# Patient Record
Sex: FEMALE | Race: AMERICAN INDIAN OR ALASKA NATIVE | Employment: FULL TIME | URBAN - METROPOLITAN AREA
[De-identification: names, ages, dates, MRNs, and addresses within clinical notes are randomized per-mention and may not be internally consistent; named-entity substitution may affect disease eponyms.]

---

## 2017-05-03 DIAGNOSIS — I10 ESSENTIAL HYPERTENSION: ICD-10-CM

## 2017-05-04 RX ORDER — CHLORTHALIDONE 50 MG/1
TABLET ORAL
Qty: 30 TAB | Refills: 0 | Status: SHIPPED | OUTPATIENT
Start: 2017-05-04 | End: 2017-05-23

## 2017-05-23 ENCOUNTER — OFFICE VISIT (OUTPATIENT)
Dept: INTERNAL MEDICINE CLINIC | Age: 47
End: 2017-05-23

## 2017-05-23 VITALS
DIASTOLIC BLOOD PRESSURE: 92 MMHG | BODY MASS INDEX: 31.05 KG/M2 | HEIGHT: 66 IN | OXYGEN SATURATION: 98 % | SYSTOLIC BLOOD PRESSURE: 160 MMHG | WEIGHT: 193.2 LBS | TEMPERATURE: 99.4 F | RESPIRATION RATE: 16 BRPM | HEART RATE: 99 BPM

## 2017-05-23 DIAGNOSIS — Z72.0 TOBACCO ABUSE: ICD-10-CM

## 2017-05-23 DIAGNOSIS — E78.5 HYPERLIPIDEMIA, UNSPECIFIED HYPERLIPIDEMIA TYPE: ICD-10-CM

## 2017-05-23 DIAGNOSIS — I10 ESSENTIAL HYPERTENSION: Primary | ICD-10-CM

## 2017-05-23 RX ORDER — LISINOPRIL AND HYDROCHLOROTHIAZIDE 12.5; 2 MG/1; MG/1
1 TABLET ORAL DAILY
Qty: 30 TAB | Refills: 3 | Status: SHIPPED | OUTPATIENT
Start: 2017-05-23 | End: 2017-06-19

## 2017-05-23 NOTE — PROGRESS NOTES
Subjective:     Chief Complaint   Patient presents with    Hypertension     She  is a 42y.o. year old female who presents for evaluation. Has had a lot of stress due to father being in ICU in [place]. Didn't have blood work done of colonoscopy performed. Has pressured speech and somewhat random. Historical Data    Past Medical History:   Diagnosis Date    Hypertension         Past Surgical History:   Procedure Laterality Date    HX GYN  1991, 1992    2 children        Outpatient Encounter Prescriptions as of 5/23/2017   Medication Sig Dispense Refill    chlorthalidone (HYGROTEN) 50 mg tablet TAKE 1 TABLET BY MOUTH DAILY 30 Tab 0    potassium chloride (K-DUR, KLOR-CON) 20 mEq tablet 1 tablet twice a day 60 Tab 3    ALPRAZolam (XANAX) 0.25 mg tablet   2    AMPHETAMINE SALT COMBO 10 mg tablet   0    LORazepam (ATIVAN) 2 mg tablet   0    pravastatin (PRAVACHOL) 20 mg tablet Take 1 Tab by mouth nightly. 30 Tab 11     No facility-administered encounter medications on file as of 5/23/2017. No Known Allergies     Social History     Social History    Marital status:      Spouse name: N/A    Number of children: N/A    Years of education: N/A     Occupational History    Not on file. Social History Main Topics    Smoking status: Light Tobacco Smoker     Packs/day: 1.00     Years: 27.00    Smokeless tobacco: Never Used    Alcohol use No    Drug use: No    Sexual activity: Yes     Partners: Male     Other Topics Concern    Not on file     Social History Narrative    No narrative on file        Review of Systems  A comprehensive review of systems was negative except for that written in the HPI. Objective:     Vitals:    05/23/17 1032   BP: (!) 160/92   Pulse: 99   Resp: 16   Temp: 99.4 °F (37.4 °C)   TempSrc: Oral   SpO2: 98%   Weight: 193 lb 3.2 oz (87.6 kg)   Height: 5' 6\" (1.676 m)     \"Amped up\" appearing female in no acute distress. Patient not re-examined.     ASSESSMENT / PLAN:   1. Essential hypertension  · Stop Hygroton and potassium. · Stop smoking. · Start lisinopril-HCTZ  - lisinopril-hydroCHLOROthiazide (PRINZIDE, ZESTORETIC) 20-12.5 mg per tablet; Take 1 Tab by mouth daily. Indications: hypertension  Dispense: 30 Tab; Refill: 3    2. Tobacco abuse  · Stop smoking. 3. Hyperlipidemia, unspecified hyperlipidemia type  · Check labs to see if needs treatment. Follow-up Disposition: Not on File   Advised her to call back or return to office if symptoms worsen/change/persist.  Discussed expected course/resolution/complications of diagnosis in detail with patient. Medication risks/benefits/costs/interactions/alternatives discussed with patient. She was given an after visit summary which includes diagnoses, current medications, & vitals. She expressed understanding with the diagnosis and plan.

## 2017-05-23 NOTE — PROGRESS NOTES
Chief Complaint   Patient presents with    Hypertension     Reviewed record in preparation for visit and have obtained necessary documentation. Identified pt with two pt identifiers(name and ). Health Maintenance Due   Topic    Pneumococcal 19-64 Medium Risk (1 of 1 - PPSV23)    DTaP/Tdap/Td series (1 - Tdap)    PAP AKA CERVICAL CYTOLOGY          Chief Complaint   Patient presents with    Hypertension        Wt Readings from Last 3 Encounters:   17 193 lb 3.2 oz (87.6 kg)   16 178 lb 4.8 oz (80.9 kg)   10/12/15 172 lb (78 kg)     Temp Readings from Last 3 Encounters:   17 99.4 °F (37.4 °C) (Oral)   16 97.1 °F (36.2 °C) (Oral)   10/12/15 97.6 °F (36.4 °C) (Oral)     BP Readings from Last 3 Encounters:   17 (!) 160/92   16 110/80   10/12/15 122/70     Pulse Readings from Last 3 Encounters:   17 99   16 86   10/12/15 84           Learning Assessment:  :     Learning Assessment 2015   PRIMARY LEARNER Patient   HIGHEST LEVEL OF EDUCATION - PRIMARY LEARNER  > 4 YEARS OF COLLEGE   BARRIERS PRIMARY LEARNER NONE   CO-LEARNER CAREGIVER No   PRIMARY LANGUAGE ENGLISH    NEED No   LEARNER PREFERENCE PRIMARY LISTENING   LEARNING SPECIAL TOPICS no   ANSWERED BY patient   RELATIONSHIP SELF       Depression Screening:  :     PHQ over the last two weeks 2016   Little interest or pleasure in doing things Not at all   Feeling down, depressed or hopeless Not at all   Total Score PHQ 2 0       Fall Risk Assessment:  :     No flowsheet data found. Abuse Screening:  :     Abuse Screening Questionnaire 2015   Do you ever feel afraid of your partner? N   Are you in a relationship with someone who physically or mentally threatens you? N   Is it safe for you to go home?  Y       Coordination of Care Questionnaire:  :     1) Have you been to an emergency room, urgent care clinic since your last visit? no   Hospitalized since your last visit? no 2) Have you seen or consulted any other health care providers outside of [facility] since your last visit? no  (Include any pap smears or colon screenings in this section.)    3) Do you have an Advance Directive on file? no    4) Are you interested in receiving information on Advance Directives? NO      Patient is accompanied by self I have received verbal consent from Charles Avila to discuss any/all medical information while they are present in the room. Reviewed record  In preparation for visit and have obtained necessary documentation.

## 2017-06-19 ENCOUNTER — OFFICE VISIT (OUTPATIENT)
Dept: INTERNAL MEDICINE CLINIC | Age: 47
End: 2017-06-19

## 2017-06-19 VITALS
RESPIRATION RATE: 16 BRPM | HEART RATE: 110 BPM | HEIGHT: 66 IN | WEIGHT: 194.7 LBS | OXYGEN SATURATION: 99 % | TEMPERATURE: 98.7 F | DIASTOLIC BLOOD PRESSURE: 80 MMHG | BODY MASS INDEX: 31.29 KG/M2 | SYSTOLIC BLOOD PRESSURE: 120 MMHG

## 2017-06-19 DIAGNOSIS — E87.6 HYPOKALEMIA: ICD-10-CM

## 2017-06-19 DIAGNOSIS — I10 ESSENTIAL HYPERTENSION: Primary | ICD-10-CM

## 2017-06-19 LAB
25(OH)D2 SERPL-MCNC: <1 NG/ML
25(OH)D3 SERPL-MCNC: 32 NG/ML
25(OH)D3+25(OH)D2 SERPL-MCNC: 33 NG/ML
ALBUMIN SERPL-MCNC: 4.3 G/DL (ref 3.5–5.5)
ALBUMIN/GLOB SERPL: 1.6 {RATIO} (ref 1.2–2.2)
ALP SERPL-CCNC: 108 IU/L (ref 39–117)
ALT SERPL-CCNC: 53 IU/L (ref 0–32)
APPEARANCE UR: CLEAR
AST SERPL-CCNC: 29 IU/L (ref 0–40)
BASOPHILS # BLD AUTO: 0.1 X10E3/UL (ref 0–0.2)
BASOPHILS NFR BLD AUTO: 1 %
BILIRUB SERPL-MCNC: <0.2 MG/DL (ref 0–1.2)
BILIRUB UR QL STRIP: NEGATIVE
BUN SERPL-MCNC: 14 MG/DL (ref 6–24)
BUN/CREAT SERPL: 23 (ref 9–23)
CALCIUM SERPL-MCNC: 9.5 MG/DL (ref 8.7–10.2)
CHLORIDE SERPL-SCNC: 94 MMOL/L (ref 96–106)
CHOLEST SERPL-MCNC: 234 MG/DL (ref 100–199)
CO2 SERPL-SCNC: 28 MMOL/L (ref 18–29)
COLOR UR: YELLOW
CREAT SERPL-MCNC: 0.61 MG/DL (ref 0.57–1)
EOSINOPHIL # BLD AUTO: 0.4 X10E3/UL (ref 0–0.4)
EOSINOPHIL NFR BLD AUTO: 3 %
ERYTHROCYTE [DISTWIDTH] IN BLOOD BY AUTOMATED COUNT: 13.4 % (ref 12.3–15.4)
EST. AVERAGE GLUCOSE BLD GHB EST-MCNC: 137 MG/DL
GLOBULIN SER CALC-MCNC: 2.7 G/DL (ref 1.5–4.5)
GLUCOSE SERPL-MCNC: 108 MG/DL (ref 65–99)
GLUCOSE UR QL: NEGATIVE
HBA1C MFR BLD: 6.4 % (ref 4.8–5.6)
HCT VFR BLD AUTO: 42.5 % (ref 34–46.6)
HDLC SERPL-MCNC: 43 MG/DL
HGB BLD-MCNC: 14.9 G/DL (ref 11.1–15.9)
HGB UR QL STRIP: NEGATIVE
IMM GRANULOCYTES # BLD: 0 X10E3/UL (ref 0–0.1)
IMM GRANULOCYTES NFR BLD: 0 %
INTERPRETATION, 910389: NORMAL
KETONES UR QL STRIP: NEGATIVE
LDLC SERPL CALC-MCNC: 119 MG/DL (ref 0–99)
LEUKOCYTE ESTERASE UR QL STRIP: NEGATIVE
LYMPHOCYTES # BLD AUTO: 3.7 X10E3/UL (ref 0.7–3.1)
LYMPHOCYTES NFR BLD AUTO: 26 %
MCH RBC QN AUTO: 31.2 PG (ref 26.6–33)
MCHC RBC AUTO-ENTMCNC: 35.1 G/DL (ref 31.5–35.7)
MCV RBC AUTO: 89 FL (ref 79–97)
MICRO URNS: NORMAL
MONOCYTES # BLD AUTO: 0.8 X10E3/UL (ref 0.1–0.9)
MONOCYTES NFR BLD AUTO: 6 %
NEUTROPHILS # BLD AUTO: 9.1 X10E3/UL (ref 1.4–7)
NEUTROPHILS NFR BLD AUTO: 64 %
NITRITE UR QL STRIP: NEGATIVE
PH UR STRIP: 7 [PH] (ref 5–7.5)
PLATELET # BLD AUTO: 322 X10E3/UL (ref 150–379)
POTASSIUM SERPL-SCNC: 3.1 MMOL/L (ref 3.5–5.2)
PROT SERPL-MCNC: 7 G/DL (ref 6–8.5)
PROT UR QL STRIP: NEGATIVE
RBC # BLD AUTO: 4.78 X10E6/UL (ref 3.77–5.28)
SODIUM SERPL-SCNC: 141 MMOL/L (ref 134–144)
SP GR UR: 1.02 (ref 1–1.03)
TRIGL SERPL-MCNC: 360 MG/DL (ref 0–149)
TSH SERPL DL<=0.005 MIU/L-ACNC: 1.4 UIU/ML (ref 0.45–4.5)
UROBILINOGEN UR STRIP-MCNC: 0.2 MG/DL (ref 0.2–1)
VLDLC SERPL CALC-MCNC: 72 MG/DL (ref 5–40)
WBC # BLD AUTO: 14.1 X10E3/UL (ref 3.4–10.8)

## 2017-06-19 RX ORDER — CHLORTHALIDONE 50 MG/1
50 TABLET ORAL DAILY
Qty: 30 TAB | Refills: 5 | Status: SHIPPED | OUTPATIENT
Start: 2017-06-19 | End: 2017-08-16 | Stop reason: SDUPTHER

## 2017-06-19 RX ORDER — POTASSIUM CHLORIDE 750 MG/1
10 TABLET, EXTENDED RELEASE ORAL 2 TIMES DAILY
Qty: 60 TAB | Refills: 2 | Status: SHIPPED | OUTPATIENT
Start: 2017-06-19 | End: 2017-10-10

## 2017-06-19 RX ORDER — CHLORTHALIDONE 50 MG/1
TABLET ORAL DAILY
COMMUNITY
End: 2017-06-19 | Stop reason: SDUPTHER

## 2017-06-19 NOTE — PROGRESS NOTES
Subjective:     Chief Complaint   Patient presents with    Medication Evaluation     She  is a 42y.o. year old female who presents for evaluation. Was given lisinopril-HCTZ but developed dizziness and cough so stopped that and went back to [facility]. Historical Data    Past Medical History:   Diagnosis Date    Hypertension         Past Surgical History:   Procedure Laterality Date    HX GYN  1991, 1992    2 children        Outpatient Encounter Prescriptions as of 6/19/2017   Medication Sig Dispense Refill    chlorthalidone (HYGROTEN) 50 mg tablet Take  by mouth daily.  ALPRAZolam (XANAX) 0.25 mg tablet   2    AMPHETAMINE SALT COMBO 10 mg tablet   0    lisinopril-hydroCHLOROthiazide (PRINZIDE, ZESTORETIC) 20-12.5 mg per tablet Take 1 Tab by mouth daily. Indications: hypertension 30 Tab 3     No facility-administered encounter medications on file as of 6/19/2017. No Known Allergies     Social History     Social History    Marital status:      Spouse name: N/A    Number of children: N/A    Years of education: N/A     Occupational History    Not on file. Social History Main Topics    Smoking status: Light Tobacco Smoker     Packs/day: 1.00     Years: 27.00    Smokeless tobacco: Never Used    Alcohol use No    Drug use: No    Sexual activity: Yes     Partners: Male     Other Topics Concern    Not on file     Social History Narrative    No narrative on file        Review of Systems  A comprehensive review of systems was negative except for that written in the HPI. Objective:     Vitals:    06/19/17 1348   BP: 120/80   Pulse: (!) 110   Resp: 16   Temp: 98.7 °F (37.1 °C)   TempSrc: Oral   SpO2: 99%   Weight: 194 lb 11.2 oz (88.3 kg)   Height: 5' 6\" (1.676 m)     Pleasant female with pressured speech. Not re-examined. ASSESSMENT / PLAN:   1. Essential hypertension  · Didn't tolerated lisinopril. - chlorthalidone (HYGROTEN) 50 mg tablet; Take 1 Tab by mouth daily. Indications: hypertension  Dispense: 30 Tab; Refill: 5    2. Hypokalemia  · Oral repletion. - potassium chloride (KLOR-CON) 10 mEq tablet; Take 1 Tab by mouth two (2) times a day. Indications: hypokalemia  Dispense: 60 Tab; Refill: 2    Patient Instructions   Follow a no concentrated sweets diet. Use potassium twice a day. Continue Hygroton. Follow-up Disposition:  Return in about 6 weeks (around 7/31/2017). Advised her to call back or return to office if symptoms worsen/change/persist.  Discussed expected course/resolution/complications of diagnosis in detail with patient. Medication risks/benefits/costs/interactions/alternatives discussed with patient. She was given an after visit summary which includes diagnoses, current medications, & vitals. She expressed understanding with the diagnosis and plan.

## 2017-06-19 NOTE — PROGRESS NOTES
Chief Complaint   Patient presents with    Medication Evaluation     Reviewed record in preparation for visit and have obtained necessary documentation. Identified pt with two pt identifiers(name and ). Health Maintenance Due   Topic    Pneumococcal 19-64 Medium Risk (1 of 1 - PPSV23)    DTaP/Tdap/Td series (1 - Tdap)    PAP AKA CERVICAL CYTOLOGY          Chief Complaint   Patient presents with    Medication Evaluation        Wt Readings from Last 3 Encounters:   17 194 lb 11.2 oz (88.3 kg)   17 193 lb 3.2 oz (87.6 kg)   16 178 lb 4.8 oz (80.9 kg)     Temp Readings from Last 3 Encounters:   17 98.7 °F (37.1 °C) (Oral)   17 99.4 °F (37.4 °C) (Oral)   16 97.1 °F (36.2 °C) (Oral)     BP Readings from Last 3 Encounters:   17 120/80   17 (!) 160/92   16 110/80     Pulse Readings from Last 3 Encounters:   17 (!) 110   17 99   16 86           Learning Assessment:  :     Learning Assessment 2015   PRIMARY LEARNER Patient   HIGHEST LEVEL OF EDUCATION - PRIMARY LEARNER  > 4 YEARS OF COLLEGE   BARRIERS PRIMARY LEARNER NONE   CO-LEARNER CAREGIVER No   PRIMARY LANGUAGE ENGLISH    NEED No   LEARNER PREFERENCE PRIMARY LISTENING   LEARNING SPECIAL TOPICS no   ANSWERED BY patient   RELATIONSHIP SELF       Depression Screening:  :     PHQ over the last two weeks 2016   Little interest or pleasure in doing things Not at all   Feeling down, depressed or hopeless Not at all   Total Score PHQ 2 0       Fall Risk Assessment:  :     No flowsheet data found. Abuse Screening:  :     Abuse Screening Questionnaire 2015   Do you ever feel afraid of your partner? N   Are you in a relationship with someone who physically or mentally threatens you? N   Is it safe for you to go home?  Y       Coordination of Care Questionnaire:  :     1) Have you been to an emergency room, urgent care clinic since your last visit? no   Hospitalized since your last visit? no             2) Have you seen or consulted any other health care providers outside of [address] [place] [address] since your last visit? no  (Include any pap smears or colon screenings in this section.)    3) Do you have an Advance Directive on file? no    4) Are you interested in receiving information on Advance Directives? NO      Patient is accompanied by self I have received verbal consent from [facility] to discuss any/all medical information while they are present in the room. Reviewed record  In preparation for visit and have obtained necessary documentation.

## 2017-10-10 ENCOUNTER — HOSPITAL ENCOUNTER (OUTPATIENT)
Age: 47
Setting detail: OBSERVATION
Discharge: LEFT BEFORE DISCHARGE | End: 2017-10-10
Attending: EMERGENCY MEDICINE | Admitting: INTERNAL MEDICINE
Payer: COMMERCIAL

## 2017-10-10 ENCOUNTER — APPOINTMENT (OUTPATIENT)
Dept: GENERAL RADIOLOGY | Age: 47
End: 2017-10-10
Attending: EMERGENCY MEDICINE
Payer: COMMERCIAL

## 2017-10-10 VITALS
SYSTOLIC BLOOD PRESSURE: 129 MMHG | RESPIRATION RATE: 19 BRPM | HEART RATE: 84 BPM | DIASTOLIC BLOOD PRESSURE: 73 MMHG | HEIGHT: 66 IN | OXYGEN SATURATION: 99 % | WEIGHT: 190 LBS | TEMPERATURE: 99 F | BODY MASS INDEX: 30.53 KG/M2

## 2017-10-10 DIAGNOSIS — R07.9 CHEST PAIN, UNSPECIFIED TYPE: Primary | ICD-10-CM

## 2017-10-10 DIAGNOSIS — E87.6 HYPOKALEMIA: ICD-10-CM

## 2017-10-10 LAB
ALBUMIN SERPL-MCNC: 3.6 G/DL (ref 3.5–5)
ALBUMIN/GLOB SERPL: 0.9 {RATIO} (ref 1.1–2.2)
ALP SERPL-CCNC: 169 U/L (ref 45–117)
ALT SERPL-CCNC: 69 U/L (ref 12–78)
ANION GAP SERPL CALC-SCNC: 5 MMOL/L (ref 5–15)
AST SERPL-CCNC: 37 U/L (ref 15–37)
BASOPHILS # BLD: 0.1 K/UL (ref 0–0.1)
BASOPHILS NFR BLD: 1 % (ref 0–1)
BILIRUB SERPL-MCNC: 0.2 MG/DL (ref 0.2–1)
BUN SERPL-MCNC: 11 MG/DL (ref 6–20)
BUN/CREAT SERPL: 14 (ref 12–20)
CALCIUM SERPL-MCNC: 9 MG/DL (ref 8.5–10.1)
CHLORIDE SERPL-SCNC: 98 MMOL/L (ref 97–108)
CO2 SERPL-SCNC: 31 MMOL/L (ref 21–32)
CREAT SERPL-MCNC: 0.77 MG/DL (ref 0.55–1.02)
D DIMER PPP FEU-MCNC: <0.17 MG/L FEU (ref 0–0.65)
EOSINOPHIL # BLD: 0.3 K/UL (ref 0–0.4)
EOSINOPHIL NFR BLD: 2 % (ref 0–7)
ERYTHROCYTE [DISTWIDTH] IN BLOOD BY AUTOMATED COUNT: 12.7 % (ref 11.5–14.5)
GLOBULIN SER CALC-MCNC: 4.2 G/DL (ref 2–4)
GLUCOSE SERPL-MCNC: 150 MG/DL (ref 65–100)
HCT VFR BLD AUTO: 44.1 % (ref 35–47)
HGB BLD-MCNC: 15.4 G/DL (ref 11.5–16)
LYMPHOCYTES # BLD: 3.2 K/UL (ref 0.8–3.5)
LYMPHOCYTES NFR BLD: 24 % (ref 12–49)
MCH RBC QN AUTO: 31.2 PG (ref 26–34)
MCHC RBC AUTO-ENTMCNC: 34.9 G/DL (ref 30–36.5)
MCV RBC AUTO: 89.5 FL (ref 80–99)
MONOCYTES # BLD: 1 K/UL (ref 0–1)
MONOCYTES NFR BLD: 8 % (ref 5–13)
NEUTS SEG # BLD: 8.6 K/UL (ref 1.8–8)
NEUTS SEG NFR BLD: 65 % (ref 32–75)
PLATELET # BLD AUTO: 313 K/UL (ref 150–400)
POTASSIUM SERPL-SCNC: 2.8 MMOL/L (ref 3.5–5.1)
PROT SERPL-MCNC: 7.8 G/DL (ref 6.4–8.2)
RBC # BLD AUTO: 4.93 M/UL (ref 3.8–5.2)
SODIUM SERPL-SCNC: 134 MMOL/L (ref 136–145)
TROPONIN I SERPL-MCNC: <0.04 NG/ML
TROPONIN I SERPL-MCNC: <0.04 NG/ML
TSH SERPL DL<=0.05 MIU/L-ACNC: 1.43 UIU/ML (ref 0.36–3.74)
WBC # BLD AUTO: 13.2 K/UL (ref 3.6–11)

## 2017-10-10 PROCEDURE — 80053 COMPREHEN METABOLIC PANEL: CPT | Performed by: EMERGENCY MEDICINE

## 2017-10-10 PROCEDURE — 96361 HYDRATE IV INFUSION ADD-ON: CPT

## 2017-10-10 PROCEDURE — 74011250637 HC RX REV CODE- 250/637: Performed by: EMERGENCY MEDICINE

## 2017-10-10 PROCEDURE — 84484 ASSAY OF TROPONIN QUANT: CPT | Performed by: EMERGENCY MEDICINE

## 2017-10-10 PROCEDURE — 99218 HC RM OBSERVATION: CPT

## 2017-10-10 PROCEDURE — 93005 ELECTROCARDIOGRAM TRACING: CPT

## 2017-10-10 PROCEDURE — 96360 HYDRATION IV INFUSION INIT: CPT

## 2017-10-10 PROCEDURE — 85379 FIBRIN DEGRADATION QUANT: CPT | Performed by: EMERGENCY MEDICINE

## 2017-10-10 PROCEDURE — 85025 COMPLETE CBC W/AUTO DIFF WBC: CPT | Performed by: EMERGENCY MEDICINE

## 2017-10-10 PROCEDURE — 36415 COLL VENOUS BLD VENIPUNCTURE: CPT | Performed by: EMERGENCY MEDICINE

## 2017-10-10 PROCEDURE — 93306 TTE W/DOPPLER COMPLETE: CPT

## 2017-10-10 PROCEDURE — 99285 EMERGENCY DEPT VISIT HI MDM: CPT

## 2017-10-10 PROCEDURE — 71020 XR CHEST PA LAT: CPT

## 2017-10-10 PROCEDURE — 74011250636 HC RX REV CODE- 250/636: Performed by: EMERGENCY MEDICINE

## 2017-10-10 PROCEDURE — 84443 ASSAY THYROID STIM HORMONE: CPT | Performed by: EMERGENCY MEDICINE

## 2017-10-10 RX ORDER — POTASSIUM CHLORIDE 750 MG/1
40 TABLET, FILM COATED, EXTENDED RELEASE ORAL
Status: COMPLETED | OUTPATIENT
Start: 2017-10-10 | End: 2017-10-10

## 2017-10-10 RX ORDER — DEXTROAMPHETAMINE SACCHARATE, AMPHETAMINE ASPARTATE, DEXTROAMPHETAMINE SULFATE AND AMPHETAMINE SULFATE 5; 5; 5; 5 MG/1; MG/1; MG/1; MG/1
20 TABLET ORAL 2 TIMES DAILY
COMMUNITY

## 2017-10-10 RX ORDER — ASPIRIN 81 MG/1
81 TABLET ORAL DAILY
Status: CANCELLED | OUTPATIENT
Start: 2017-10-11

## 2017-10-10 RX ORDER — DEXTROAMPHETAMINE SACCHARATE, AMPHETAMINE ASPARTATE, DEXTROAMPHETAMINE SULFATE AND AMPHETAMINE SULFATE 2.5; 2.5; 2.5; 2.5 MG/1; MG/1; MG/1; MG/1
20 TABLET ORAL 2 TIMES DAILY
Status: CANCELLED | OUTPATIENT
Start: 2017-10-10

## 2017-10-10 RX ORDER — ENOXAPARIN SODIUM 100 MG/ML
40 INJECTION SUBCUTANEOUS EVERY 24 HOURS
Status: CANCELLED | OUTPATIENT
Start: 2017-10-10

## 2017-10-10 RX ORDER — GUAIFENESIN 100 MG/5ML
325 LIQUID (ML) ORAL
Status: COMPLETED | OUTPATIENT
Start: 2017-10-10 | End: 2017-10-10

## 2017-10-10 RX ORDER — METOPROLOL TARTRATE 25 MG/1
25 TABLET, FILM COATED ORAL EVERY 12 HOURS
Status: CANCELLED | OUTPATIENT
Start: 2017-10-10

## 2017-10-10 RX ORDER — SODIUM CHLORIDE 0.9 % (FLUSH) 0.9 %
5-10 SYRINGE (ML) INJECTION AS NEEDED
Status: CANCELLED | OUTPATIENT
Start: 2017-10-10

## 2017-10-10 RX ORDER — ALPRAZOLAM 0.25 MG/1
0.25 TABLET ORAL
Status: CANCELLED | OUTPATIENT
Start: 2017-10-10

## 2017-10-10 RX ORDER — POTASSIUM CHLORIDE 750 MG/1
10 TABLET, FILM COATED, EXTENDED RELEASE ORAL 2 TIMES DAILY
COMMUNITY
End: 2018-02-16 | Stop reason: SDUPTHER

## 2017-10-10 RX ORDER — LANOLIN ALCOHOL/MO/W.PET/CERES
400 CREAM (GRAM) TOPICAL DAILY
Status: CANCELLED | OUTPATIENT
Start: 2017-10-10

## 2017-10-10 RX ORDER — NITROGLYCERIN 0.4 MG/1
0.4 TABLET SUBLINGUAL
Status: CANCELLED | OUTPATIENT
Start: 2017-10-10

## 2017-10-10 RX ORDER — SODIUM CHLORIDE 0.9 % (FLUSH) 0.9 %
5-10 SYRINGE (ML) INJECTION EVERY 8 HOURS
Status: CANCELLED | OUTPATIENT
Start: 2017-10-10

## 2017-10-10 RX ORDER — POTASSIUM CHLORIDE 750 MG/1
40 TABLET, FILM COATED, EXTENDED RELEASE ORAL 3 TIMES DAILY
Status: CANCELLED | OUTPATIENT
Start: 2017-10-10

## 2017-10-10 RX ORDER — ACETAMINOPHEN 325 MG/1
650 TABLET ORAL
Status: CANCELLED | OUTPATIENT
Start: 2017-10-10

## 2017-10-10 RX ADMIN — ASPIRIN 81 MG 324 MG: 81 TABLET ORAL at 08:33

## 2017-10-10 RX ADMIN — POTASSIUM CHLORIDE 40 MEQ: 750 TABLET, FILM COATED, EXTENDED RELEASE ORAL at 09:08

## 2017-10-10 RX ADMIN — SODIUM CHLORIDE 1000 ML: 900 INJECTION, SOLUTION INTRAVENOUS at 08:33

## 2017-10-10 RX ADMIN — NITROGLYCERIN 1 INCH: 20 OINTMENT TOPICAL at 08:33

## 2017-10-10 NOTE — ED PROVIDER NOTES
HPI Comments: 41 y.o. female with past medical history significant for hypertension and hyperlipidemia who presents from work with chief complaint of chest pain. Patient was working here in the hospital taking report when she had sudden onset of intermittent non-pleuritic chest pressure which became constant and more severe. Patient reports associated aching to left side of jaw and bilateral hand numbness. Symptoms began approximately 40 minutes ago and have been waxing and waning since onset. Patient states she felt fine this morning, had a minimal breakfast with acid free orange juice, and took her BP medication. Patient does not take ASA daily. She reports one previous episode of similar symptoms prior to starting BP medication when her BP was 200/100. Patient denies shortness of breath. There are no other acute medical concerns at this time. Social hx: Current everyday smoker. Works as a nurse here in the hospital.  Significant FMHx: No early CAD in immediate family. PCP: Elinor Gates MD    Note written by Farhan Licea, as dictated by David Dalton MD 8:22 AM      The history is provided by the patient. Past Medical History:   Diagnosis Date    ADD (attention deficit disorder)     Hyperlipidemia     Hypertension        Past Surgical History:   Procedure Laterality Date    HX GYN  1991, 1992    2 children         Family History:   Problem Relation Age of Onset    Heart Disease Mother     Hypertension Mother     Diabetes Father     Heart Disease Father     Hypertension Father     Stroke Father        Social History     Social History    Marital status:      Spouse name: N/A    Number of children: N/A    Years of education: N/A     Occupational History    Not on file.      Social History Main Topics    Smoking status: Light Tobacco Smoker     Packs/day: 1.00     Years: 27.00    Smokeless tobacco: Never Used    Alcohol use No    Drug use: No    Sexual activity: Yes     Partners: Male     Other Topics Concern    Not on file     Social History Narrative         ALLERGIES: Lisinopril    Review of Systems   Constitutional: Negative for fever. HENT: Negative for facial swelling. Eyes: Negative for visual disturbance. Respiratory: Negative for chest tightness. Cardiovascular: Positive for chest pain. Gastrointestinal: Negative for abdominal pain. Genitourinary: Negative for dysuria. Musculoskeletal: Negative for arthralgias. Skin: Negative for rash. Neurological: Positive for numbness. Negative for dizziness. Hematological: Negative for adenopathy. Psychiatric/Behavioral: Negative for suicidal ideas. There were no vitals filed for this visit. Physical Exam   Constitutional: She is oriented to person, place, and time. She appears well-developed and well-nourished. No distress. HENT:   Head: Normocephalic and atraumatic. Mouth/Throat: Oropharynx is clear and moist.   Eyes: Pupils are equal, round, and reactive to light. No scleral icterus. Neck: Normal range of motion. Neck supple. No thyromegaly present. Cardiovascular: Regular rhythm, normal heart sounds and intact distal pulses. Tachycardia present. No murmur heard. Pulmonary/Chest: Effort normal and breath sounds normal. No respiratory distress. Abdominal: Soft. Bowel sounds are normal. She exhibits no distension. There is no tenderness. Musculoskeletal: Normal range of motion. She exhibits no edema. Neurological: She is alert and oriented to person, place, and time. Skin: Skin is warm and dry. No rash noted. She is not diaphoretic. Nursing note and vitals reviewed. Note written by Mauricio Harris. Mary Kay Ponce, as dictated by Linsey Talley MD 8:18 AM       [facility]  ED Course       Procedures    ED EKG interpretation:  Rhythm:  Sinus tach. Rate (approx.): 118. Axis: normal.  ST segment:  No concerning ST elevations or depressions.  This EKG was interpreted by Lisset Barrett MD,ED Provider. Chest X-ray, 2 view:  Chest xray, PA and Lat, shows no signs of acute disease. This CXR was interpreted by Lisset Barrett MD, ED Provider. 9:23 AM  Labs unremarkable except for K=2.8  Trop and d-dimer neg  Pain relieved with nitro paste  Pt remains tachycardic to 110's after 1000cc IVF bolus. Pt also appears anxious. Will discuss with cardiology for possible stress test.     11:00 AM  Dr. Aftab Morin seeing patient. Plan for admission but patient is hesitant. Dr. Aftab Morin discussing with patient. 11:12 AM  Dr. Aftab Morin to admit. Repeat trop and TTE ordered.

## 2017-10-10 NOTE — ED NOTES
RN spoke to patient about negative troponin result and awaiting ECHO result. Patient reports that she really does not want to stay overnight in the hospital. Patient agreed to stay for the ECHO results. Patient repeatedly telling nurse that she really doesn't want to stay overnight but would rather be formally discharged instead of leave AMA.

## 2017-10-10 NOTE — ED NOTES
1427: Patient comes to nurses station stating that she needs to leave and that she cannot stay here anymore. RN paged Dr Marilyn Jang. While RN on phone, patient walked out of ER.     1430: RN spoke with Dr Marilyn Jang about patient leaving.

## 2017-10-10 NOTE — Clinical Note
Patient Class[de-identified] Observation [335] Type of Bed: Telemetry [19] Reason for Observation: CP Admitting Diagnosis: Chest pain of uncertain etiology [[id number]] Admitting Physician: Dayo Gayle [[id number]] Attending Physician: Dayo Gayle [[id number]]

## 2017-10-10 NOTE — ED NOTES
RN spoke with ECHO tech regarding patient's results. ECHO has not been read by cardiologist at this point. Will update patient.

## 2017-10-10 NOTE — H&P
[address]   [address] [facility], [address] [place] [address]   HISTORY AND PHYSICAL       Name:  Kaya Mcwilliams   MR#:  [id number]   :  1970   Account #:  [de-identified]        Date of Adm:  10/10/2017       HISTORY OF PRESENT ILLNESS: This is a 44-year-old woman who   is admitted to the hospital with chest pain. 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 The symptoms lasted about 10   minutes. She was quite uncomfortable, came to the ER for evaluation. She was found to be hypertensive, with a blood pressure in the 190   range. She has come down into the 959 systolic range. She was found   to be hypokalemic, with a potassium down to 2.8, and is feeling much   better. There are no acute EKG changes. Some 2 or 3 years ago, she   had an episode of what she describes as \"malignant\" hypertension,   with blood pressures over 200. She has had difficult to control blood   pressure with apparent multiple difficulties with antihypertensive   medicines. In light of her presentation, she will be admitted, get serial   enzymes, EKG and echo this afternoon, potassium repletion and   planning for a stress evaluation as the next step, depending on clinical   course. PAST MEDICAL HISTORY: Hypertension, ADHD. No operations. No   other hospitalizations, except for overnight stay for 2 childbirths that   were uncomplicated. MEDICINES AT PRESENTATION   1. Xanax 0.25 mg three times daily as needed. 2. Chlorthalidone 50 mg a day. 3. Adderall 20 mg twice a day. 4. Potassium 10 mEq twice a day. ALLERGIES   1. LISINOPRIL. 2. HYDROCHLOROTHIAZIDE, WHICH GAVE HER NAUSEA AND   VOMITING. 3. SHE IS NOT KNOWN TO BE ALLERGIC TO SULFA, AND DID   NOT HAVE ANGIOEDEMA.     FAMILY HISTORY: Positive for hypertension. 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 , 2   children, here with her , very attentive, smokes half pack a   day, no alcohol. REVIEW OF SYSTEMS: Up until the onset of these symptoms of chest   discomfort, she had been feeling well. No unusual stress. No fevers,   chills, sweats or skin rash. No other symptoms of chest discomfort. Remainder of review of systems has been assessed and is negative. PHYSICAL EXAMINATION   GENERAL: No acute distress, pleasant middle-aged woman. VITAL SIGNS: Blood pressure initially was in the 188 systolic range,   down to 157/94 on arrival in the ER, now down to 129/73, with a pulse   92 and regular, afebrile. HEENT: Pupils equal, round and react to light and accommodation. Extraocular muscles full without nystagmus. Thyroid not palpable. Sclerae are clear. No JVD. No adenopathy. LUNGS: Clear. HEART: Regular rate and rhythm without murmur, rub, gallop or click. ABDOMEN: Without masses or organomegaly. EXTREMITIES: Without edema. Distal pulses are intact. SKIN: Intact. MUSCULOSKELETAL: No skeletal deformities. NEUROLOGIC: Nonfocal.    EKG: Sinus tachycardia, nonspecific ST and T-wave changes. Chest   x-ray normal.    LABORATORY DATA: Hemoglobin 15.4, hematocrit 44.1, white count   13,200, platelets 069,754. Troponin less than 0.17. Sodium 134,   potassium 2.8. Troponin less than 0.04. TSH normal at 1.43. Creatinine   0.77, glucose 150. PROBLEMS   1. Chest pain syndrome, she certainly has risk factors for myocardial   ischemia. 2. Hypokalemia. 3. Hypertension, difficult to control, with a prior episode of malignant   hypertension and apparent multiple drug sensitivities, full details not   available. 4. Attention deficit hyperactivity disorder. 5. Anxiety.     PLANS: Admit for overnight observation, get an echo, serial enzymes,   EKGs, replete potassium, magnesium, and plan for noninvasive   evaluation, depending on the patient's clinical course. I stopped   chlorthalidone, and will give a trial of low-dose beta blocker for now.         Bebe Hanson MD      [place] [address] / [facility]   D:  10/10/2017   11:14   T:  10/10/2017   12:00   Job #:  [id number]

## 2017-10-10 NOTE — ED TRIAGE NOTES
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

## 2017-10-10 NOTE — PROGRESS NOTES
Admission Medication Reconciliation:    Information obtained from: Patient, RX Query    Significant PMH/Disease States:   Past Medical History:   Diagnosis Date    ADD (attention deficit disorder)     Hyperlipidemia     Hypertension        Chief Complaint for this Admission:  CP, HBP    Allergies:  Lisinopril    Prior to Admission Medications:   Prior to Admission Medications   Prescriptions Last Dose Informant Patient Reported? Taking? ALPRAZolam (XANAX) 0.25 mg tablet 10/9/2017 at 2100  Yes Yes   Sig: Take 0.25 mg by mouth three (3) times daily as needed. chlorthalidone (HYGROTEN) 50 mg tablet 10/10/2017 at 0700  No Yes   Sig: TAKE 1 TABLET BY MOUTH DAILY   dextroamphetamine-amphetamine (ADDERALL) 20 mg tablet 10/9/2017 at 1700  Yes Yes   Sig: Take 20 mg by mouth two (2) times a day. potassium chloride SR (KLOR-CON 10) 10 mEq tablet 10/9/2017 at 1700  Yes Yes   Sig: Take 10 mEq by mouth two (2) times a day. Facility-Administered Medications: None         Comments/Recommendations: Patient is an excellent historian; [employer] was also utilized. Patient had Lisinopril listed as her allergy, however she had been taking Lisinopril-HCTZ when she developed the nausea and vomiting. Patient reports that she takes Adderall 20 mg BID (previously listed as 10 mg), and last dose was last night. She also reports that she takes potassium 10 mEq BID, however 'I don't take it as often as I should'.       New:  n/a  Changed:  Corrected Xanax instructions (0.25 mg TID prn), Adderall (10 mg --> 20 mg BID)  D/C:  n/a

## 2017-10-11 LAB
ATRIAL RATE: 118 BPM
CALCULATED P AXIS, ECG09: 53 DEGREES
CALCULATED R AXIS, ECG10: 48 DEGREES
CALCULATED T AXIS, ECG11: 50 DEGREES
DIAGNOSIS, 93000: NORMAL
P-R INTERVAL, ECG05: 132 MS
Q-T INTERVAL, ECG07: 344 MS
QRS DURATION, ECG06: 96 MS
QTC CALCULATION (BEZET), ECG08: 482 MS
VENTRICULAR RATE, ECG03: 118 BPM

## 2018-02-16 ENCOUNTER — OFFICE VISIT (OUTPATIENT)
Dept: INTERNAL MEDICINE CLINIC | Age: 48
End: 2018-02-16

## 2018-02-16 VITALS
HEART RATE: 120 BPM | SYSTOLIC BLOOD PRESSURE: 160 MMHG | RESPIRATION RATE: 16 BRPM | HEIGHT: 66 IN | BODY MASS INDEX: 31.18 KG/M2 | OXYGEN SATURATION: 98 % | TEMPERATURE: 98.3 F | WEIGHT: 194 LBS | DIASTOLIC BLOOD PRESSURE: 80 MMHG

## 2018-02-16 DIAGNOSIS — Z72.0 TOBACCO ABUSE: ICD-10-CM

## 2018-02-16 DIAGNOSIS — Z00.00 WELL ADULT EXAM: ICD-10-CM

## 2018-02-16 DIAGNOSIS — E78.5 HYPERLIPIDEMIA, UNSPECIFIED HYPERLIPIDEMIA TYPE: ICD-10-CM

## 2018-02-16 DIAGNOSIS — I10 ESSENTIAL HYPERTENSION: Primary | ICD-10-CM

## 2018-02-16 DIAGNOSIS — I10 ESSENTIAL HYPERTENSION: ICD-10-CM

## 2018-02-16 RX ORDER — DILTIAZEM HYDROCHLORIDE 180 MG/1
180 CAPSULE, COATED, EXTENDED RELEASE ORAL DAILY
Qty: 30 CAP | Refills: 4 | Status: SHIPPED | OUTPATIENT
Start: 2018-02-16

## 2018-02-16 NOTE — TELEPHONE ENCOUNTER
Requested Prescriptions     Pending Prescriptions Disp Refills    chlorthalidone (HYGROTEN) 50 mg tablet 30 Tab 3    potassium chloride SR (KLOR-CON 10) 10 mEq tablet       Sig: Take 1 Tab by mouth two (2) times a day.     pt wants to get 90 days supply if possible sent to  on file  Pt last ov [id number]

## 2018-02-16 NOTE — PROGRESS NOTES
Chief Complaint   Patient presents with    Hypertension     Reviewed record in preparation for visit and have obtained necessary documentation. Identified pt with two pt identifiers(name and ). Health Maintenance Due   Topic    Pneumococcal 19-64 Medium Risk (1 of 1 - PPSV23)    DTaP/Tdap/Td series (1 - Tdap)    PAP AKA CERVICAL CYTOLOGY          Chief Complaint   Patient presents with    Hypertension        Wt Readings from Last 3 Encounters:   18 194 lb (88 kg)   10/10/17 190 lb (86.2 kg)   17 194 lb 11.2 oz (88.3 kg)     Temp Readings from Last 3 Encounters:   18 98.3 °F (36.8 °C) (Oral)   10/10/17 99 °F (37.2 °C)   17 98.7 °F (37.1 °C) (Oral)     BP Readings from Last 3 Encounters:   18 160/80   10/10/17 129/73   17 120/80     Pulse Readings from Last 3 Encounters:   18 (!) 120   10/10/17 84   17 (!) 110           Learning Assessment:  :     Learning Assessment 2017   PRIMARY LEARNER Patient Patient   HIGHEST LEVEL OF EDUCATION - PRIMARY LEARNER  > 4 YEARS OF COLLEGE > 4 YEARS OF COLLEGE   BARRIERS PRIMARY LEARNER NONE NONE   CO-LEARNER CAREGIVER No No   PRIMARY LANGUAGE ENGLISH ENGLISH    NEED - No   LEARNER PREFERENCE PRIMARY LISTENING LISTENING   LEARNING SPECIAL TOPICS - no   ANSWERED BY patient patient   RELATIONSHIP SELF SELF       Depression Screening:  :     PHQ over the last two weeks 2016   Little interest or pleasure in doing things Not at all   Feeling down, depressed or hopeless Not at all   Total Score PHQ 2 0       Fall Risk Assessment:  :     No flowsheet data found. Abuse Screening:  :     Abuse Screening Questionnaire 2017   Do you ever feel afraid of your partner? N N   Are you in a relationship with someone who physically or mentally threatens you? N N   Is it safe for you to go home?  Y Y       Coordination of Care Questionnaire:  :     1) Have you been to an emergency room, urgent care clinic since your last visit? no   Hospitalized since your last visit? no             2) Have you seen or consulted any other health care providers outside of [address] [place] [address] since your last visit? no  (Include any pap smears or colon screenings in this section.)    3) Do you have an Advance Directive on file? no    4) Are you interested in receiving information on Advance Directives? NO      Patient is accompanied by self I have received verbal consent from Ross Ready to discuss any/all medical information while they are present in the room. Reviewed record  In preparation for visit and have obtained necessary documentation.

## 2018-02-16 NOTE — PATIENT INSTRUCTIONS
Follow a low sodium / DASH diet. Stay physically active. Start Cardizem  mg once daily. Return for a BP check in 6 weeks. Have fasting blood work analysis.

## 2018-02-16 NOTE — MR AVS SNAPSHOT
[facility], [address] [place] [address] 
[phone] Patient: Giuseppe Herbert MRN: [id number] PQE:[birth year] Visit Information Date & Time Provider Department Dept. Phone Encounter #  
 2/16/2018  1:15 PM MD [facility]  Follow-up Instructions Return in about 6 weeks (around 3/30/2018) for F/U HTN. Upcoming Health Maintenance Date Due Pneumococcal 19-64 Medium Risk (1 of 1 - PPSV23) 7/7/1991 DTaP/Tdap/Td series (1 - Tdap) 7/7/1993 PAP AKA CERVICAL CYTOLOGY 7/7/1993 Allergies as of 2/16/2018  Review Complete On: 2/16/2018 By: Park Taylor MD  
  
 Severity Noted Reaction Type Reactions Lisinopril-hydrochlorothiazide  10/10/2017    Nausea and Vomiting Current Immunizations  Never Reviewed Name Date Influenza Vaccine 10/1/2014 TB Skin Test (PPD) 1/5/2015 Not reviewed this visit You Were Diagnosed With   
  
 Codes Comments Essential hypertension    -  Primary ICD-10-CM: I10 
ICD-9-CM: 401.9 Tobacco abuse     ICD-10-CM: Z72.0 ICD-9-CM: 305.1 Hyperlipidemia, unspecified hyperlipidemia type     ICD-10-CM: E78.5 ICD-9-CM: 272.4 Well adult exam     ICD-10-CM: Z00.00 ICD-9-CM: V70.0 Vitals BP Pulse Temp Resp Height(growth percentile) Weight(growth percentile) 160/80 (BP 1 Location: Left arm, BP Patient Position: Sitting) (!) 120 98.3 °F (36.8 °C) (Oral) 16 5' 6\" (1.676 m) 194 lb (88 kg) SpO2 BMI OB Status Smoking Status 98% 31.31 kg/m2 Having regular periods Light Tobacco Smoker Vitals History BMI and BSA Data Body Mass Index Body Surface Area  
 31.31 kg/m 2 2.02 m 2 Preferred Pharmacy Pharmacy Name Phone [address] [phone] Your Updated Medication List  
  
   
This list is accurate as of: 2/16/18  1:53 PM.  Always use your most recent med list.  
  
  
  
  
 ADDERALL 20 mg tablet Generic drug:  dextroamphetamine-amphetamine Take 20 mg by mouth two (2) times a day. ALPRAZolam 0.25 mg tablet Commonly known as:  Cherelle Ida Take 0.25 mg by mouth three (3) times daily as needed. chlorthalidone 50 mg tablet Commonly known as:  HYGROTEN  
TAKE 1 TABLET BY MOUTH DAILY  
  
 dilTIAZem  mg ER capsule Commonly known as:  CARDIZEM CD Take 1 Cap by mouth daily. KLOR-CON 10 10 mEq tablet Generic drug:  potassium chloride SR Take 10 mEq by mouth two (2) times a day. Prescriptions Sent to Pharmacy Refills  
 dilTIAZem CD (CARDIZEM CD) 180 mg ER capsule 4 Sig: Take 1 Cap by mouth daily. Class: Normal  
 [email or web address] #: [phone] Route: Oral  
  
Follow-up Instructions Return in about 6 weeks (around 3/30/2018) for F/U HTN. To-Do List   
 02/19/2018 Lab:  CBC WITH AUTOMATED DIFF   
  
 02/19/2018 Lab:  CORTISOL, AM   
  
 02/19/2018 Lab:  LIPID PANEL   
  
 02/19/2018 Lab:  MAGNESIUM   
  
 02/19/2018 Lab:  METABOLIC PANEL, COMPREHENSIVE   
  
 02/19/2018 Lab:  TSH REFLEX TO T4   
  
 02/19/2018 Lab:  ZINC Patient Instructions Follow a low sodium / DASH diet. Stay physically active. Start Cardizem  mg once daily. Return for a BP check in 6 weeks. Have fasting blood work analysis. Introducing [facility]! Dear Jose Paulino: Thank you for requesting a Squabbler account. Our records indicate that you already have an active Squabbler account. You can access your account anytime at [email or web address] Did you know that you can access your hospital and ER discharge instructions at any time in Cytoguide? You can also review all of your test results from your hospital stay or ER visit. Additional Information If you have questions, please visit the Frequently Asked Questions section of the Cytoguide website at [email or web address] Remember, Cytoguide is NOT to be used for urgent needs. For medical emergencies, dial 911. Now available from your iPhone and Android! Please provide this summary of care documentation to your next provider. Your primary care clinician is listed as Darrin Ennis. If you have any questions after today's visit, please call [phone].

## 2018-02-16 NOTE — PROGRESS NOTES
Subjective:     Chief Complaint   Patient presents with    Hypertension     She  is a 41y.o. year old female who presents for evaluation. Father is still ill in hospital [facility]). 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 She has pressured speech and goes from one thing to another easily. Has a laundry list of blood tests that she would like done. Still having periods but wonders if she is going into menopause. Historical Data    Past Medical History:   Diagnosis Date    ADD (attention deficit disorder)     Hyperlipidemia     Hypertension         Past Surgical History:   Procedure Laterality Date    HX GYN  1991, 1992    2 children        Outpatient Encounter Prescriptions as of 2/16/2018   Medication Sig Dispense Refill    chlorthalidone (HYGROTEN) 50 mg tablet TAKE 1 TABLET BY MOUTH DAILY 30 Tab 3    dextroamphetamine-amphetamine (ADDERALL) 20 mg tablet Take 20 mg by mouth two (2) times a day.  potassium chloride SR (KLOR-CON 10) 10 mEq tablet Take 10 mEq by mouth two (2) times a day.  ALPRAZolam (XANAX) 0.25 mg tablet Take 0.25 mg by mouth three (3) times daily as needed. 2     No facility-administered encounter medications on file as of 2/16/2018. Allergies   Allergen Reactions    Lisinopril-Hydrochlorothiazide Nausea and Vomiting        Social History     Social History    Marital status:      Spouse name: N/A    Number of children: N/A    Years of education: N/A     Occupational History    Not on file. Social History Main Topics    Smoking status: Light Tobacco Smoker     Packs/day: 1.00     Years: 27.00    Smokeless tobacco: Never Used    Alcohol use No    Drug use: No    Sexual activity: Yes     Partners: Male     Other Topics Concern    Not on file     Social History Narrative        Review of Systems  A comprehensive review of systems was negative except for that written in the HPI.     Objective:     Vitals:    02/16/18 1325 BP: 160/80   Pulse: (!) 120   Resp: 16   Temp: 98.3 °F (36.8 °C)   TempSrc: Oral   SpO2: 98%   Weight: 194 lb (88 kg)   Height: 5' 6\" (1.676 m)     Pleasant WF with pressured speech. Neck: Supple. Cardiac:  RRR without murmurs, gallops or rubs. Lungs: Clear to auscultation. Abdomen: Some fullness in RUQ of abdomen. Extremities: No edema  Neuro: Intact. ASSESSMENT / PLAN:   1. Essential hypertension  · Low sodium /. DASH diet  · Patient ascribes to stress but obviously still has end organ effects. · Continue chlorthalidone. · Add diltiazem. - dilTIAZem CD (CARDIZEM CD) 180 mg ER capsule; Take 1 Cap by mouth daily. Dispense: 30 Cap; Refill: 4  - MAGNESIUM; Future  - ZINC; Future  - CORTISOL, AM; Future    2. Tobacco abuse  · Stop smoking. 3. Hyperlipidemia, unspecified hyperlipidemia type    - LIPID PANEL; Future    4. Well adult exam    - METABOLIC PANEL, COMPREHENSIVE; Future  - TSH REFLEX TO T4; Future  - CBC WITH AUTOMATED DIFF; Future         This note will not be viewable in [employer]. Follow-up Disposition:  Return in about 6 weeks (around 3/30/2018) for F/U HTN. Advised her to call back or return to office if symptoms worsen/change/persist.  Discussed expected course/resolution/complications of diagnosis in detail with patient. Medication risks/benefits/costs/interactions/alternatives discussed with patient. She was given an after visit summary which includes diagnoses, current medications, & vitals. She expressed understanding with the diagnosis and plan.

## 2018-02-18 RX ORDER — CHLORTHALIDONE 50 MG/1
TABLET ORAL
Qty: 90 TAB | Refills: 2 | Status: SHIPPED | OUTPATIENT
Start: 2018-02-18 | End: 2018-06-09 | Stop reason: SDUPTHER

## 2018-02-18 RX ORDER — POTASSIUM CHLORIDE 750 MG/1
10 TABLET, FILM COATED, EXTENDED RELEASE ORAL 2 TIMES DAILY
Qty: 90 TAB | Refills: 2 | Status: SHIPPED | OUTPATIENT
Start: 2018-02-18 | End: 2018-03-19 | Stop reason: SDUPTHER

## 2018-03-17 ENCOUNTER — HOSPITAL ENCOUNTER (EMERGENCY)
Age: 48
Discharge: HOME | End: 2018-03-17
Attending: EMERGENCY MEDICINE
Payer: COMMERCIAL

## 2018-03-17 ENCOUNTER — APPOINTMENT (OUTPATIENT)
Dept: CT IMAGING | Age: 48
End: 2018-03-17
Attending: EMERGENCY MEDICINE
Payer: COMMERCIAL

## 2018-03-17 VITALS
RESPIRATION RATE: 22 BRPM | SYSTOLIC BLOOD PRESSURE: 134 MMHG | TEMPERATURE: 98.5 F | OXYGEN SATURATION: 94 % | DIASTOLIC BLOOD PRESSURE: 68 MMHG | HEART RATE: 96 BPM

## 2018-03-17 DIAGNOSIS — S22.42XA CLOSED FRACTURE OF MULTIPLE RIBS OF LEFT SIDE, INITIAL ENCOUNTER: Primary | ICD-10-CM

## 2018-03-17 LAB
ALBUMIN SERPL-MCNC: 4 G/DL (ref 3.5–5)
ALBUMIN/GLOB SERPL: 1.1 {RATIO} (ref 1.1–2.2)
ALP SERPL-CCNC: 109 U/L (ref 45–117)
ALT SERPL-CCNC: 85 U/L (ref 12–78)
ANION GAP BLD CALC-SCNC: 18 MMOL/L (ref 10–20)
ANION GAP SERPL CALC-SCNC: 10 MMOL/L (ref 5–15)
AST SERPL-CCNC: 55 U/L (ref 15–37)
BASOPHILS # BLD: 0.1 K/UL (ref 0–0.1)
BASOPHILS NFR BLD: 1 % (ref 0–1)
BILIRUB SERPL-MCNC: 0.6 MG/DL (ref 0.2–1)
BUN BLD-MCNC: 14 MG/DL (ref 9–20)
BUN SERPL-MCNC: 15 MG/DL (ref 6–20)
BUN/CREAT SERPL: 21 (ref 12–20)
CA-I BLD-MCNC: 1.17 MMOL/L (ref 1.12–1.32)
CALCIUM SERPL-MCNC: 9.3 MG/DL (ref 8.5–10.1)
CHLORIDE BLD-SCNC: 94 MMOL/L (ref 98–107)
CHLORIDE SERPL-SCNC: 97 MMOL/L (ref 97–108)
CO2 BLD-SCNC: 30 MMOL/L (ref 21–32)
CO2 SERPL-SCNC: 29 MMOL/L (ref 21–32)
CREAT BLD-MCNC: 0.5 MG/DL (ref 0.6–1.3)
CREAT SERPL-MCNC: 0.72 MG/DL (ref 0.55–1.02)
DIFFERENTIAL METHOD BLD: ABNORMAL
EOSINOPHIL # BLD: 0.2 K/UL (ref 0–0.4)
EOSINOPHIL NFR BLD: 2 % (ref 0–7)
ERYTHROCYTE [DISTWIDTH] IN BLOOD BY AUTOMATED COUNT: 13.1 % (ref 11.5–14.5)
GLOBULIN SER CALC-MCNC: 3.7 G/DL (ref 2–4)
GLUCOSE BLD-MCNC: 144 MG/DL (ref 65–100)
GLUCOSE SERPL-MCNC: 139 MG/DL (ref 65–100)
HCT VFR BLD AUTO: 44.3 % (ref 35–47)
HCT VFR BLD CALC: 44 % (ref 35–47)
HGB BLD-MCNC: 15.6 G/DL (ref 11.5–16)
IMM GRANULOCYTES # BLD: 0.1 K/UL (ref 0–0.04)
IMM GRANULOCYTES NFR BLD AUTO: 1 % (ref 0–0.5)
LYMPHOCYTES # BLD: 3.6 K/UL (ref 0.8–3.5)
LYMPHOCYTES NFR BLD: 25 % (ref 12–49)
MCH RBC QN AUTO: 31.7 PG (ref 26–34)
MCHC RBC AUTO-ENTMCNC: 35.2 G/DL (ref 30–36.5)
MCV RBC AUTO: 90 FL (ref 80–99)
MONOCYTES # BLD: 1.1 K/UL (ref 0–1)
MONOCYTES NFR BLD: 7 % (ref 5–13)
NEUTS SEG # BLD: 9.3 K/UL (ref 1.8–8)
NEUTS SEG NFR BLD: 65 % (ref 32–75)
NRBC # BLD: 0 K/UL (ref 0–0.01)
NRBC BLD-RTO: 0 PER 100 WBC
PLATELET # BLD AUTO: 269 K/UL (ref 150–400)
PMV BLD AUTO: 10.3 FL (ref 8.9–12.9)
POTASSIUM BLD-SCNC: 2.3 MMOL/L (ref 3.5–5.1)
POTASSIUM SERPL-SCNC: 2.9 MMOL/L (ref 3.5–5.1)
PROT SERPL-MCNC: 7.7 G/DL (ref 6.4–8.2)
RBC # BLD AUTO: 4.92 M/UL (ref 3.8–5.2)
SERVICE CMNT-IMP: ABNORMAL
SODIUM BLD-SCNC: 138 MMOL/L (ref 136–145)
SODIUM SERPL-SCNC: 136 MMOL/L (ref 136–145)
WBC # BLD AUTO: 14.4 K/UL (ref 3.6–11)

## 2018-03-17 PROCEDURE — 36415 COLL VENOUS BLD VENIPUNCTURE: CPT | Performed by: EMERGENCY MEDICINE

## 2018-03-17 PROCEDURE — 74011000258 HC RX REV CODE- 258: Performed by: EMERGENCY MEDICINE

## 2018-03-17 PROCEDURE — 71260 CT THORAX DX C+: CPT

## 2018-03-17 PROCEDURE — 74177 CT ABD & PELVIS W/CONTRAST: CPT

## 2018-03-17 PROCEDURE — 74011250636 HC RX REV CODE- 250/636: Performed by: EMERGENCY MEDICINE

## 2018-03-17 PROCEDURE — 96374 THER/PROPH/DIAG INJ IV PUSH: CPT

## 2018-03-17 PROCEDURE — 80053 COMPREHEN METABOLIC PANEL: CPT | Performed by: EMERGENCY MEDICINE

## 2018-03-17 PROCEDURE — 74011250637 HC RX REV CODE- 250/637: Performed by: EMERGENCY MEDICINE

## 2018-03-17 PROCEDURE — 99284 EMERGENCY DEPT VISIT MOD MDM: CPT

## 2018-03-17 PROCEDURE — 74011636320 HC RX REV CODE- 636/320: Performed by: EMERGENCY MEDICINE

## 2018-03-17 PROCEDURE — 85025 COMPLETE CBC W/AUTO DIFF WBC: CPT | Performed by: EMERGENCY MEDICINE

## 2018-03-17 PROCEDURE — 80047 BASIC METABLC PNL IONIZED CA: CPT

## 2018-03-17 RX ORDER — OXYCODONE AND ACETAMINOPHEN 5; 325 MG/1; MG/1
1 TABLET ORAL
Qty: 25 TAB | Refills: 0 | Status: SHIPPED | OUTPATIENT
Start: 2018-03-17 | End: 2018-03-19 | Stop reason: DRUGHIGH

## 2018-03-17 RX ORDER — CYCLOBENZAPRINE HCL 5 MG
5 TABLET ORAL
Qty: 20 TAB | Refills: 0 | Status: SHIPPED | OUTPATIENT
Start: 2018-03-17 | End: 2018-03-19 | Stop reason: DRUGHIGH

## 2018-03-17 RX ORDER — LIDOCAINE 50 MG/G
PATCH TOPICAL
Qty: 7 EACH | Refills: 0 | Status: SHIPPED | OUTPATIENT
Start: 2018-03-17 | End: 2018-03-19

## 2018-03-17 RX ORDER — POTASSIUM CHLORIDE 750 MG/1
40 TABLET, FILM COATED, EXTENDED RELEASE ORAL
Status: COMPLETED | OUTPATIENT
Start: 2018-03-17 | End: 2018-03-17

## 2018-03-17 RX ORDER — SODIUM CHLORIDE 0.9 % (FLUSH) 0.9 %
10 SYRINGE (ML) INJECTION
Status: COMPLETED | OUTPATIENT
Start: 2018-03-17 | End: 2018-03-17

## 2018-03-17 RX ORDER — FENTANYL CITRATE 50 UG/ML
100 INJECTION, SOLUTION INTRAMUSCULAR; INTRAVENOUS
Status: COMPLETED | OUTPATIENT
Start: 2018-03-17 | End: 2018-03-17

## 2018-03-17 RX ADMIN — POTASSIUM CHLORIDE 40 MEQ: 750 TABLET, EXTENDED RELEASE ORAL at 21:12

## 2018-03-17 RX ADMIN — IOPAMIDOL 100 ML: 755 INJECTION, SOLUTION INTRAVENOUS at 20:19

## 2018-03-17 RX ADMIN — SODIUM CHLORIDE 100 ML: 900 INJECTION, SOLUTION INTRAVENOUS at 20:19

## 2018-03-17 RX ADMIN — Medication 10 ML: at 20:19

## 2018-03-17 RX ADMIN — FENTANYL CITRATE 100 MCG: 50 INJECTION, SOLUTION INTRAMUSCULAR; INTRAVENOUS at 19:52

## 2018-03-17 NOTE — ED TRIAGE NOTES
Triage note: Pt slipped getting out of the shower into the edge of the shower this morning landing on her left ribs. Pt was seen at [facility] this afternoon. Pt states she has 3 broken ribs and possible ruptured spleen.

## 2018-03-17 NOTE — ED PROVIDER NOTES
HPI Comments: 41 y.o. female with past medical history significant for HTN, hyperlipidemia, and ADD who presents to the ED with chief complaint of evaluation for a fall. Patient reports left lateral rib, left flank, and left abdominal pain with onset at ~0800 \"this morning. \" Patient reports slipping and falling onto her left side while getting out of her shower at ~0800 \"this morning,\" reports falling onto the edge of the shower. Patient denies hitting her head or losing consciousness during her fall. Patient reports being seen at an Urgent Care center for her symptoms \"earlier today,\" reports undergoing X-Rays, being told she had rib fractures, and being referred to the ED to rule out splenic injury. There are no other acute medical concerns at this time. PCP: Abhay Esparza MD    Note written by Samson Rivera, as dictated by Allen Andre MD 7:45 PM     The history is provided by the patient. Past Medical History:   Diagnosis Date    ADD (attention deficit disorder)     Hyperlipidemia     Hypertension        Past Surgical History:   Procedure Laterality Date    HX GYN  1991, 1992    2 children         Family History:   Problem Relation Age of Onset    Heart Disease Mother     Hypertension Mother     Diabetes Father     Heart Disease Father     Hypertension Father     Stroke Father        Social History     Social History    Marital status:      Spouse name: N/A    Number of children: N/A    Years of education: N/A     Occupational History    Not on file. Social History Main Topics    Smoking status: Light Tobacco Smoker     Packs/day: 1.00     Years: 27.00    Smokeless tobacco: Never Used    Alcohol use No    Drug use: No    Sexual activity: Yes     Partners: Male     Other Topics Concern    Not on file     Social History Narrative         ALLERGIES: Lisinopril-hydrochlorothiazide    Review of Systems   Constitutional: Negative for chills and fever. HENT: Negative for rhinorrhea and sore throat. Respiratory: Negative for cough and shortness of breath. Cardiovascular: Negative for chest pain. Gastrointestinal: Positive for abdominal pain. Negative for diarrhea, nausea and vomiting. Genitourinary: Negative for dysuria and urgency. Musculoskeletal: Positive for arthralgias. Negative for back pain. Skin: Negative for rash. Neurological: Negative for dizziness, weakness and light-headedness. All other systems reviewed and are negative. Vitals:    03/17/18 1945   Pulse: (!) (P) 102   Resp: (P) 20   Temp: (P) 98.6 °F (37 °C)   SpO2: (P) 98%            Physical Exam     Vital signs reviewed. Nursing notes reviewed. Const:  Well developed, well nourished; appears uncomfortable. Head:  Atraumatic, normocephalic  Eyes:  PERRL, conjunctiva normal, no scleral icterus  Neck:  Supple, trachea midline  Cardiovascular:  RRR, no murmurs, no gallops, no rubs  Resp:  No resp distress, no increased work of breathing, no wheezes, no rhonchi, no rales,  Abd:  Soft, non-tender, non-distended, no rebound, no guarding, no CVA tenderness  :  Deferred  MSK:  Bruising and tenderness over the left flank, lateral ribs, and left abdomen. Neuro:  Alert and oriented x3, no cranial nerve defect  Skin:  Warm, dry, intact  Psych: normal mood and affect, behavior is normal, judgement and thought content is normal  Note written by Samson Renee, as dictated by Justo De Luna MD 7:59 PM    MDM  Number of Diagnoses or Management Options  Closed fracture of multiple ribs of left side, initial encounter:      Amount and/or Complexity of Data Reviewed  Clinical lab tests: ordered and reviewed  Tests in the radiology section of CPT®: ordered and reviewed  Review and summarize past medical records: yes    Patient Progress  Patient progress: stable      ED Course     Pt. Presents to the ER with complaints of flank pain. Pt. Found to have two rib fractures. No pneumothorax, splenic injury, or other visceral injury on CT. I will start her on percocet, flexeril, and a lidoderm patch. Pt. To f/u with her PCP or return to the ER with worsening sx. Procedures    CONSULT NOTE:  7:51 PM Izzy Crawford MD spoke with Urgent Care physician. Discussed available diagnostic tests and clinical findings. Provider is in agreement with care plans as outlined. Provider states the patient has two non-displaced rib fractures via X-Ray; recommends doing a CT scan of the abdomen to rule out splenic injury; states the patient received Dilaudid (2 mg) for pain while at Urgent Care.

## 2018-03-18 NOTE — ED NOTES
MD reviewed discharge instructions and options with patient and patient verbalized understanding. RN reviewed discharge instructions using teachback method. Pt ambulated to exit without difficulty and in no signs of acute distress escorted by self, and   will drive home. No complaints or needs expressed at this time. Patient was counseled on medications prescribed at discharge. VSS, verbalized relief from most intense pain. Patient to call PCP in the morning for appointment.

## 2018-03-19 ENCOUNTER — OFFICE VISIT (OUTPATIENT)
Dept: INTERNAL MEDICINE CLINIC | Age: 48
End: 2018-03-19

## 2018-03-19 VITALS
HEIGHT: 66 IN | WEIGHT: 203.8 LBS | TEMPERATURE: 97.8 F | BODY MASS INDEX: 32.75 KG/M2 | DIASTOLIC BLOOD PRESSURE: 88 MMHG | RESPIRATION RATE: 20 BRPM | OXYGEN SATURATION: 97 % | SYSTOLIC BLOOD PRESSURE: 148 MMHG | HEART RATE: 98 BPM

## 2018-03-19 DIAGNOSIS — S22.42XA CLOSED FRACTURE OF MULTIPLE RIBS OF LEFT SIDE, INITIAL ENCOUNTER: ICD-10-CM

## 2018-03-19 RX ORDER — METFORMIN HYDROCHLORIDE 500 MG/1
TABLET, EXTENDED RELEASE ORAL
Refills: 2 | COMMUNITY
Start: 2018-03-01 | End: 2018-03-19

## 2018-03-19 RX ORDER — DOCUSATE SODIUM 100 MG/1
100 CAPSULE, LIQUID FILLED ORAL 2 TIMES DAILY
Qty: 60 CAP | Refills: 2 | Status: SHIPPED | OUTPATIENT
Start: 2018-03-19 | End: 2018-06-17

## 2018-03-19 RX ORDER — SERTRALINE HYDROCHLORIDE 50 MG/1
TABLET, FILM COATED ORAL
Refills: 0 | COMMUNITY
Start: 2018-02-20 | End: 2018-03-19

## 2018-03-19 RX ORDER — POTASSIUM CHLORIDE 750 MG/1
TABLET, EXTENDED RELEASE ORAL
Refills: 2 | COMMUNITY
Start: 2018-02-18 | End: 2019-10-25

## 2018-03-19 RX ORDER — CHLORTHALIDONE 25 MG/1
TABLET ORAL
COMMUNITY
Start: 2018-01-06 | End: 2018-03-19

## 2018-03-19 RX ORDER — ALPRAZOLAM 0.5 MG/1
TABLET ORAL
Refills: 1 | COMMUNITY
Start: 2018-02-28

## 2018-03-19 RX ORDER — NALOXONE HYDROCHLORIDE 2 MG/.4ML
2 INJECTION, SOLUTION INTRAMUSCULAR; SUBCUTANEOUS
Qty: 1 DEVICE | Refills: 0 | Status: SHIPPED | OUTPATIENT
Start: 2018-03-19 | End: 2019-10-25

## 2018-03-19 RX ORDER — LIDOCAINE 50 MG/G
1 PATCH TOPICAL EVERY 12 HOURS
Qty: 1 EACH | Refills: 0 | Status: SHIPPED | OUTPATIENT
Start: 2018-03-19 | End: 2019-10-25

## 2018-03-19 RX ORDER — CYCLOBENZAPRINE HCL 10 MG
10 TABLET ORAL
Qty: 21 TAB | Refills: 0 | Status: SHIPPED | OUTPATIENT
Start: 2018-03-19 | End: 2019-10-25

## 2018-03-19 RX ORDER — OXYCODONE AND ACETAMINOPHEN 10; 325 MG/1; MG/1
1 TABLET ORAL
Qty: 84 TAB | Refills: 0 | Status: SHIPPED | OUTPATIENT
Start: 2018-03-19 | End: 2019-10-25

## 2018-03-19 RX ORDER — POTASSIUM CHLORIDE 750 MG/1
TABLET, EXTENDED RELEASE ORAL
COMMUNITY
Start: 2018-03-15 | End: 2018-03-19 | Stop reason: SDUPTHER

## 2018-03-19 NOTE — LETTER
3/19/2018 3:09 PM 
 
 
 
 
Ms. Flores Query Karla [address] [facility] [id number] Please excuse Charisse Rios from work 3/19/2018. He had to accompany my patient to her appointment today. Sincerely, Cheri Gonzalez NP

## 2018-03-19 NOTE — PATIENT INSTRUCTIONS
Alleve, 1 pill 2x/day     Percocet 1 tabs every 4 hours as needed, do not take more than 6 tabs total a day (max 4,000mg total of acetaminophen daily)    Flexeril 10mg every 8 hours as needed    Continue lidoderm patches 12 hours on, 12 hours off to area of pain    Obtain and wear Spanx to help with compression and pain    Obtain and use incentive spirometer 3x/day

## 2018-03-19 NOTE — PROGRESS NOTES
Subjective:      Racheal Ashton is a 41 y.o. female who presents today for follow up of rib fractures    Slipped after getting out of the bathtub and fell on left side. Had dyspnea. Was seen in [facility] ED, CT confirming left 7th and 8th ribs with nondisplaced fractures. Did not hit head    Taking Alleve intermitently  Was given Percocet 5/325mg tabs 1 tab every 4 hours. Has been taking every 4 hours and is not working well for her. Does get relief when she takes 2 tabs at a time  Has been using lidoderm patches without improvement    Has not been using incentive spirometer    Is still short of breath, any movement is excruciating  Has been taking extra percocet    Has not been using abdominal binder  Has been using ice to area    Is unable to get out of the recliner      Patient Active Problem List    Diagnosis Date Noted    Chest pain of uncertain etiology 22/90/7349    Hyperlipidemia 10/12/2015    ADD (attention deficit disorder) 10/12/2015    Essential hypertension 07/01/2015    Tobacco abuse 07/01/2015     Current Outpatient Prescriptions   Medication Sig Dispense Refill    ALPRAZolam (XANAX) 0.5 mg tablet TK 1 T PO  TID PRF ANXIETY  1    potassium chloride (KLOR-CON) 10 mEq tablet TK 1 T PO BID  2    oxyCODONE-acetaminophen (PERCOCET) 5-325 mg per tablet Take 1 Tab by mouth every four (4) hours as needed for Pain. Max Daily Amount: 6 Tabs. 25 Tab 0    cyclobenzaprine (FLEXERIL) 5 mg tablet Take 1 Tab by mouth three (3) times daily as needed for Muscle Spasm(s). 20 Tab 0    chlorthalidone (HYGROTEN) 50 mg tablet TAKE 1 TABLET BY MOUTH DAILY 90 Tab 2    dilTIAZem CD (CARDIZEM CD) 180 mg ER capsule Take 1 Cap by mouth daily. 30 Cap 4    dextroamphetamine-amphetamine (ADDERALL) 20 mg tablet Take 20 mg by mouth two (2) times a day.       chlorthalidone (HYGROTEN) 25 mg tablet       metFORMIN ER (GLUCOPHAGE XR) 500 mg tablet TK 1 T PO BID  2    sertraline (ZOLOFT) 50 mg tablet TK 1 T PO QD  0    lidocaine (LIDODERM) 5 % Apply patch to the affected area for 12 hours a day and remove for 12 hours a day. 7 Each 0    ALPRAZolam (XANAX) 0.25 mg tablet Take 0.25 mg by mouth three (3) times daily as needed. 2        Review of Systems    Pertinent items are noted in HPI. Objective:     Visit Vitals    /88 (BP 1 Location: Left arm, BP Patient Position: Sitting)    Pulse 98    Temp 97.8 °F (36.6 °C) (Oral)    Resp 20    Ht 5' 6\" (1.676 m)    Wt 203 lb 12.8 oz (92.4 kg)    SpO2 97%    BMI 32.89 kg/m2     General appearance: alert, cooperative, appears to be uncomfortable and in pain appears stated age  Head: Normocephalic, without obvious abnormality, atraumatic  Lungs: nonlabored  Heart: regular rate  Extremities: extremities normal, steady gait  Skin: bruising left rib cage  Neurologic: Grossly normal  Psych: appears to be in pain, talking fast and repeating herself    Assessment/Plan:     1. Closed fracture of multiple ribs of left side, initial encounter  - reviewed CT Scans  -  reviewed 3/19/2018   - discussed with Dr. Codie Almanzar. Per gayathri Barrientos to give patient 2 weeks worth of pain medication  - patient verbalized understanding to only take narcotics if needed for extreme pain, aware of addictive potential  - bowel regimen with narcotic use, patient aware of importance of this  - Spanx for compression  - docusate sodium (COLACE) 100 mg capsule; Take 1 Cap by mouth two (2) times a day for 90 days. Dispense: 60 Cap; Refill: 2  - cyclobenzaprine (FLEXERIL) 10 mg tablet; Take 1 Tab by mouth three (3) times daily as needed for Muscle Spasm(s). Dispense: 21 Tab; Refill: 0  - oxyCODONE-acetaminophen (PERCOCET 10)  mg per tablet; Take 1 Tab by mouth every four (4) hours as needed for Pain. Max Daily Amount: 6 Tabs. Dispense: 84 Tab; Refill: 0  - AMB SUPPLY ORDER  - naloxone (EVZIO) 2 mg/0.4 mL auto-injector; 0.4 mL by IntraMUSCular route once as needed for Overdose for up to 1 dose. Indications: OPIATE-INDUCED RESPIRATORY DEPRESSION  Dispense: 1 Device; Refill: 0  - lidocaine (LIDODERM) 5 %; 1 Patch by TransDERmal route every twelve (12) hours. Apply patch to the affected area for 12 hours a day and remove for 12 hours a day. Dispense: 1 Each; Refill: 0    Advised her to call back or return to office if symptoms worsen/change/persist.  Discussed expected course/resolution/complications of diagnosis in detail with patient. Medication risks/benefits/costs/interactions/alternatives discussed with patient. She was given an after visit summary which includes diagnoses, current medications, & vitals. She expressed understanding with the diagnosis and plan.     FRANCK Mora

## 2018-03-19 NOTE — MR AVS SNAPSHOT
[address] [facility], [address] [place] [address] 
[phone] Patient: Sha Dillon MRN: [id number] Visit Information Date & Time Provider Department Dept. Phone Encounter #  
 3/19/2018  2:00 PM [facility] [phone] [id number] Upcoming Health Maintenance Date Due Pneumococcal 19-64 Medium Risk (1 of 1 - PPSV23) 7/7/1991 DTaP/Tdap/Td series (1 - Tdap) 7/7/1993 PAP AKA CERVICAL CYTOLOGY 7/7/1993 Allergies as of 3/19/2018  Review Complete On: 3/19/2018 By: Mo Chau Severity Noted Reaction Type Reactions Lisinopril-hydrochlorothiazide  10/10/2017    Nausea and Vomiting Current Immunizations  Never Reviewed Name Date Influenza Vaccine 10/1/2014 TB Skin Test (PPD) 1/5/2015 Not reviewed this visit You Were Diagnosed With   
  
 Codes Comments Closed fracture of multiple ribs of left side, initial encounter     ICD-10-CM: S22.42XA ICD-9-CM: 807.09 Vitals BP Pulse Temp Resp Height(growth percentile) Weight(growth percentile) 148/88 (BP 1 Location: Left arm, BP Patient Position: Sitting) 98 97.8 °F (36.6 °C) (Oral) 20 5' 6\" (1.676 m) 203 lb 12.8 oz (92.4 kg) SpO2 BMI OB Status Smoking Status 97% 32.89 kg/m2 Having regular periods Light Tobacco Smoker Vitals History BMI and BSA Data Body Mass Index Body Surface Area  
 32.89 kg/m 2 2.07 m 2 Preferred Pharmacy Pharmacy Name Phone [address] [phone] Your Updated Medication List  
  
   
This list is accurate as of 3/19/18  5:43 PM.  Always use your most recent med list.  
  
  
  
  
 ADDERALL 20 mg tablet Generic drug:  dextroamphetamine-amphetamine Take 20 mg by mouth two (2) times a day. ALPRAZolam 0.5 mg tablet Commonly known as:  Delicia Donald TK 1 T PO  TID PRF ANXIETY  
  
 chlorthalidone 50 mg tablet Commonly known as:  HYGROTEN  
TAKE 1 TABLET BY MOUTH DAILY  
  
 cyclobenzaprine 10 mg tablet Commonly known as:  FLEXERIL Take 1 Tab by mouth three (3) times daily as needed for Muscle Spasm(s). dilTIAZem  mg ER capsule Commonly known as:  CARDIZEM CD Take 1 Cap by mouth daily. docusate sodium 100 mg capsule Commonly known as:  Olene Cam Take 1 Cap by mouth two (2) times a day for 90 days. lidocaine 5 % Commonly known as:  LIDODERM  
1 Patch by TransDERmal route every twelve (12) hours. Apply patch to the affected area for 12 hours a day and remove for 12 hours a day.  
  
 naloxone 2 mg/0.4 mL auto-injector Commonly known as:  EVZIO  
0.4 mL by IntraMUSCular route once as needed for Overdose for up to 1 dose. Indications: OPIATE-INDUCED RESPIRATORY DEPRESSION  
  
 oxyCODONE-acetaminophen  mg per tablet Commonly known as:  PERCOCET 10 Take 1 Tab by mouth every four (4) hours as needed for Pain. Max Daily Amount: 6 Tabs. potassium chloride 10 mEq tablet Commonly known as:  KLOR-CON TK 1 T PO BID Prescriptions Printed Refills  
 oxyCODONE-acetaminophen (PERCOCET 10)  mg per tablet 0 Sig: Take 1 Tab by mouth every four (4) hours as needed for Pain. Max Daily Amount: 6 Tabs. Class: Print Route: Oral  
  
Prescriptions Sent to Pharmacy Refills  
 docusate sodium (COLACE) 100 mg capsule 2 Sig: Take 1 Cap by mouth two (2) times a day for 90 days. Class: Normal  
 Pharmacy: [facility] [address] Ph #: [phone] Route: Oral  
 cyclobenzaprine (FLEXERIL) 10 mg tablet 0 Sig: Take 1 Tab by mouth three (3) times daily as needed for Muscle Spasm(s).   
 Class: Normal  
 Pharmacy: [facility] [address] [facility] [address] [place] Ph #: [phone] Route: Oral  
 naloxone (EVZIO) 2 mg/0.4 mL auto-injector 0 Si.4 mL by IntraMUSCular route once as needed for Overdose for up to 1 dose. Indications: OPIATE-INDUCED RESPIRATORY DEPRESSION Class: Normal  
 Pharmacy: [facility] [address] [facility] [place] [place] Ph #: [phone] Route: IntraMUSCular  
 lidocaine (LIDODERM) 5 % 0 Si Patch by TransDERmal route every twelve (12) hours. Apply patch to the affected area for 12 hours a day and remove for 12 hours a day. Class: Normal  
 Pharmacy: [facility] [address] [facility] [address] [place] Ph #: [phone] Route: TransDERmal  
  
We Performed the Following AMB SUPPLY ORDER [[id number] Custom] Comments:  
 Incentive spirometer Patient Instructions Alleve, 1 pill 2x/day Percocet 1 tabs every 4 hours as needed, do not take more than 6 tabs total a day (max 4,000mg total of acetaminophen daily) Flexeril 10mg every 8 hours as needed Continue lidoderm patches 12 hours on, 12 hours off to area of pain Obtain and wear Spanx to help with compression and pain Obtain and use incentive spirometer 3x/day Introducing [facility]! Dear Kathy Trent: Thank you for requesting a [employer] account. Our records indicate that you already have an active [employer] account. You can access your account anytime at [email or web address] Did you know that you can access your hospital and ER discharge instructions at any time in [employer]? You can also review all of your test results from your hospital stay or ER visit. Additional Information If you have questions, please visit the Frequently Asked Questions section of the [employer] website at [email or web address] Remember, [employer] is NOT to be used for urgent needs. For medical emergencies, dial 911. Now available from your iPhone and Android! Please provide this summary of care documentation to your next provider. Your primary care clinician is listed as Narayan Ruiz. If you have any questions after today's visit, please call [phone].

## 2018-03-19 NOTE — PROGRESS NOTES
Chief Complaint   Patient presents with    Rib Injury     \"fell on Saturday while shaving legs in the shower and hit her left side\"     1. Have you been to the ER, urgent care clinic since your last visit? Hospitalized since your last visit? Seen at [facility] for 3 broken ribs on 3/17/18. 2. Have you seen or consulted any other health care providers outside of the [address] [place] [address] since your last visit? Include any pap smears or colon screening.  No    Visit Vitals    /88 (BP 1 Location: Left arm, BP Patient Position: Sitting)    Pulse 98    Temp 97.8 °F (36.6 °C) (Oral)    Resp 20    Ht 5' 6\" (1.676 m)    Wt 203 lb 12.8 oz (92.4 kg)    SpO2 97%    BMI 32.89 kg/m2

## 2018-06-08 DIAGNOSIS — I10 ESSENTIAL HYPERTENSION: ICD-10-CM

## 2018-06-09 RX ORDER — CHLORTHALIDONE 50 MG/1
TABLET ORAL
Qty: 30 TAB | Refills: 3 | Status: SHIPPED | OUTPATIENT
Start: 2018-06-09

## 2019-10-25 ENCOUNTER — HOSPITAL ENCOUNTER (EMERGENCY)
Age: 49
Discharge: LEFT BEFORE DISCHARGE | End: 2019-10-25
Attending: EMERGENCY MEDICINE | Admitting: EMERGENCY MEDICINE
Payer: COMMERCIAL

## 2019-10-25 VITALS
HEART RATE: 107 BPM | RESPIRATION RATE: 16 BRPM | WEIGHT: 202 LBS | TEMPERATURE: 98.1 F | OXYGEN SATURATION: 97 % | DIASTOLIC BLOOD PRESSURE: 109 MMHG | SYSTOLIC BLOOD PRESSURE: 169 MMHG | BODY MASS INDEX: 32.6 KG/M2

## 2019-10-25 DIAGNOSIS — R42 DIZZINESS: Primary | ICD-10-CM

## 2019-10-25 LAB
ALBUMIN SERPL-MCNC: 4.4 G/DL (ref 3.5–5)
ALBUMIN/GLOB SERPL: 1 {RATIO} (ref 1.1–2.2)
ALP SERPL-CCNC: 147 U/L (ref 45–117)
ALT SERPL-CCNC: 107 U/L (ref 12–78)
ANION GAP SERPL CALC-SCNC: 8 MMOL/L (ref 5–15)
AST SERPL-CCNC: 88 U/L (ref 15–37)
BASOPHILS # BLD: 0.2 K/UL (ref 0–0.1)
BASOPHILS NFR BLD: 1 % (ref 0–1)
BILIRUB SERPL-MCNC: 0.4 MG/DL (ref 0.2–1)
BUN SERPL-MCNC: 12 MG/DL (ref 6–20)
BUN/CREAT SERPL: 14 (ref 12–20)
CALCIUM SERPL-MCNC: 9.8 MG/DL (ref 8.5–10.1)
CHLORIDE SERPL-SCNC: 96 MMOL/L (ref 97–108)
CK SERPL-CCNC: 129 U/L (ref 26–192)
CO2 SERPL-SCNC: 28 MMOL/L (ref 21–32)
CREAT SERPL-MCNC: 0.84 MG/DL (ref 0.55–1.02)
DIFFERENTIAL METHOD BLD: ABNORMAL
EOSINOPHIL # BLD: 0.3 K/UL (ref 0–0.4)
EOSINOPHIL NFR BLD: 2 % (ref 0–7)
ERYTHROCYTE [DISTWIDTH] IN BLOOD BY AUTOMATED COUNT: 13.5 % (ref 11.5–14.5)
GLOBULIN SER CALC-MCNC: 4.5 G/DL (ref 2–4)
GLUCOSE SERPL-MCNC: 158 MG/DL (ref 65–100)
HCT VFR BLD AUTO: 48.8 % (ref 35–47)
HGB BLD-MCNC: 16.6 G/DL (ref 11.5–16)
IMM GRANULOCYTES # BLD AUTO: 0.2 K/UL (ref 0–0.04)
IMM GRANULOCYTES NFR BLD AUTO: 1 % (ref 0–0.5)
LYMPHOCYTES # BLD: 4.5 K/UL (ref 0.8–3.5)
LYMPHOCYTES NFR BLD: 26 % (ref 12–49)
MCH RBC QN AUTO: 30.1 PG (ref 26–34)
MCHC RBC AUTO-ENTMCNC: 34 G/DL (ref 30–36.5)
MCV RBC AUTO: 88.6 FL (ref 80–99)
MONOCYTES # BLD: 0.9 K/UL (ref 0–1)
MONOCYTES NFR BLD: 5 % (ref 5–13)
NEUTS SEG # BLD: 11.2 K/UL (ref 1.8–8)
NEUTS SEG NFR BLD: 65 % (ref 32–75)
NRBC # BLD: 0 K/UL (ref 0–0.01)
NRBC BLD-RTO: 0 PER 100 WBC
PLATELET # BLD AUTO: 390 K/UL (ref 150–400)
PMV BLD AUTO: 9.9 FL (ref 8.9–12.9)
POTASSIUM SERPL-SCNC: 3.5 MMOL/L (ref 3.5–5.1)
PROT SERPL-MCNC: 8.9 G/DL (ref 6.4–8.2)
RBC # BLD AUTO: 5.51 M/UL (ref 3.8–5.2)
RBC MORPH BLD: ABNORMAL
SODIUM SERPL-SCNC: 132 MMOL/L (ref 136–145)
TROPONIN I SERPL-MCNC: <0.05 NG/ML
WBC # BLD AUTO: 17.3 K/UL (ref 3.6–11)

## 2019-10-25 PROCEDURE — 80053 COMPREHEN METABOLIC PANEL: CPT

## 2019-10-25 PROCEDURE — 36415 COLL VENOUS BLD VENIPUNCTURE: CPT

## 2019-10-25 PROCEDURE — 93005 ELECTROCARDIOGRAM TRACING: CPT

## 2019-10-25 PROCEDURE — 99283 EMERGENCY DEPT VISIT LOW MDM: CPT

## 2019-10-25 PROCEDURE — 84484 ASSAY OF TROPONIN QUANT: CPT

## 2019-10-25 PROCEDURE — 85025 COMPLETE CBC W/AUTO DIFF WBC: CPT

## 2019-10-25 PROCEDURE — 82550 ASSAY OF CK (CPK): CPT

## 2019-10-25 NOTE — ED NOTES
Case discussed with Dr Antonio Isabel; pt anxious, needs reassurance to stay in ED. Will add labs and repeat troponin after 2 hours. Will do chest xray and NS fluid bolus if pt agrees.

## 2019-10-25 NOTE — ED NOTES
Pt placed on cardiac monitor. Pt reports some nausea, thinks she may be dehydrated.   Pt reports feeling anxious about being in the hospital.  States her father just passed 5 months ago; she is traveling back to his hometown on Monday

## 2019-10-25 NOTE — ED NOTES
Pt refused lab draw, IVF, pulling IV out. Pt signed informed refusal and left AMA, alert. Pt instructed to return to ED or other healthcare if sx persists.

## 2019-10-26 LAB
ATRIAL RATE: 111 BPM
CALCULATED P AXIS, ECG09: 59 DEGREES
CALCULATED R AXIS, ECG10: 45 DEGREES
CALCULATED T AXIS, ECG11: 49 DEGREES
DIAGNOSIS, 93000: NORMAL
P-R INTERVAL, ECG05: 128 MS
Q-T INTERVAL, ECG07: 332 MS
QRS DURATION, ECG06: 92 MS
QTC CALCULATION (BEZET), ECG08: 451 MS
VENTRICULAR RATE, ECG03: 111 BPM

## 2019-10-27 NOTE — ED PROVIDER NOTES
EMERGENCY DEPARTMENT HISTORY AND PHYSICAL EXAM      Date: 10/25/2019  Patient Name: Rowan Waterman  Patient Age and Sex: 54 y.o. 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 Her coworker noticed that she was not feeling well, measured her blood pressure at that time and noted to be elevated. Upon noting the BP, she also began having SOB and mild chest tightness. Her coworkers urged her to come to the ED for an evaluation. Once she arrived here, her lightheadedness and SOB both resolved. She denies having any symptoms but is very anxious about being a patient herself rather than a healthcare provider. Upon further questioning, the patient tells me that her father  of a medica error and since then she has been involved in medical litigation issues. She mistrusts all healthcare settings and is anxious ad tearful about being here in the ED. Pt denies any other alleviating or exacerbating factors. There are no other complaints, changes or physical findings at this time.      Past Medical History:   Diagnosis Date    ADD (attention deficit disorder)     Hyperlipidemia     Hypertension      Past Surgical History:   Procedure Laterality Date    HX GYN  1992    2 children       PCP: Vi Hazel MD    Past History   Past Medical History:  Past Medical History:   Diagnosis Date    ADD (attention deficit disorder)     Hyperlipidemia     Hypertension        Past Surgical History:  Past Surgical History:   Procedure Laterality Date    HX GYN  1992    2 children       Family History:  Family History Problem Relation Age of Onset    Heart Disease Mother     Hypertension Mother     Diabetes Father     Heart Disease Father     Hypertension Father     Stroke Father        Social History:  Social History     Tobacco Use    Smoking status: Light Tobacco Smoker     Packs/day: 1.00     Years: 27.00     Pack years: 27.00    Smokeless tobacco: Never Used   Substance Use Topics    Alcohol use: No     Alcohol/week: 0.0 standard drinks    Drug use: No       Allergies: Allergies   Allergen Reactions    Lisinopril-Hydrochlorothiazide Nausea and Vomiting       Current Medications:  No current facility-administered medications on file prior to encounter. Current Outpatient Medications on File Prior to Encounter   Medication Sig Dispense Refill    chlorthalidone (HYGROTEN) 50 mg tablet TAKE 1 TABLET BY MOUTH DAILY 30 Tab 3    ALPRAZolam (XANAX) 0.5 mg tablet TK 1 T PO  TID PRF ANXIETY  1    dextroamphetamine-amphetamine (ADDERALL) 20 mg tablet Take 20 mg by mouth two (2) times a day.  dilTIAZem CD (CARDIZEM CD) 180 mg ER capsule Take 1 Cap by mouth daily. 30 Cap 4       Review of Systems   Review of Systems   Constitutional: Negative. Negative for appetite change, chills and fever. HENT: Negative for congestion, ear pain, rhinorrhea, sinus pain, trouble swallowing and voice change. Respiratory: Positive for chest tightness and shortness of breath. Negative for cough, wheezing and stridor. Cardiovascular: Negative for chest pain, palpitations and leg swelling. Gastrointestinal: Negative for abdominal pain, blood in stool, constipation, diarrhea, nausea and vomiting. Genitourinary: Negative for difficulty urinating, dysuria, flank pain, frequency and hematuria. Musculoskeletal: Negative for arthralgias and joint swelling. Skin: Negative. Neurological: Positive for light-headedness. Negative for dizziness, syncope, weakness, numbness and headaches.    Psychiatric/Behavioral: Negative for confusion, hallucinations, self-injury and suicidal ideas. The patient is nervous/anxious. All other systems reviewed and are negative. Physical Exam   Physical Exam   Constitutional: She is oriented to person, place, and time. She appears well-developed. She appears distressed. Tearful and very anxious appearing   HENT:   Head: Atraumatic. Mouth/Throat: Oropharynx is clear and moist.   Eyes: Pupils are equal, round, and reactive to light. Conjunctivae and EOM are normal. No scleral icterus. Neck: Normal range of motion. Neck supple. No JVD present. Cardiovascular: Normal rate, regular rhythm, normal heart sounds and intact distal pulses. Pulmonary/Chest: Effort normal and breath sounds normal. She exhibits no tenderness. Abdominal: Soft. Bowel sounds are normal. She exhibits no distension. There is no tenderness. Musculoskeletal: Normal range of motion. She exhibits no edema. Neurological: She is alert and oriented to person, place, and time. No cranial nerve deficit. Skin: Skin is warm and dry. Psychiatric: Her speech is normal. Judgment normal. Her mood appears anxious. She is agitated. She is not aggressive, not actively hallucinating and not combative. Thought content is not delusional. Cognition and memory are normal. She expresses no homicidal and no suicidal ideation. She expresses no suicidal plans and no homicidal plans. Nursing note and vitals reviewed. Diagnostic Study Results     Labs -  No results found for this or any previous visit (from the past 24 hour(s)). Radiologic Studies -   XR CHEST PA LAT    (Results Pending)         Medical Decision Making   I am the first provider for this patient. Records Reviewed: I reviewed our electronic medical record system for any past medical records that were available that may contribute to the patient's current condition, including their PMH, surgical history, social and family history.  Reviewed the nursing notes and vital signs from today's visit. Vital Signs-Reviewed the patient's vital signs. Provider Notes (Medical Decision Making):   Ms. Lou Chaudhari is here for a medical evaluation of htn and an episode of dizziness and sob which occurred during her shift today. She is very tearful and anxious about being a patient in the ED given her father's tragic history. After spending about 15 minutes providing reassurance that no interventions will be done in the ED today without her knowledge and prior approval, she seems to calm. The event she expienced earlier today is difficulty to classify as anything specific mainly because all her symptoms have now resolved. Plan is to get basic labs and serial cardiac enzymes 3 hours apart. She agrees with this plan, appears calmer. All questions answered. Reeval:  I was just notified that the patient took her IV out and left the department. Based on my initial evaluation of her, there is nothing to suggest that she does not have the capacity to make her own medical decisions. I have asked the triage staff to keep an eye out for her. Should she return, they should bring her back immediately where we can resume her workup and address any other concerns she may have. ED Course:   Initial assessment performed. The patients presenting problems have been discussed, and they are in agreement with the care plan formulated and outlined with them. I have encouraged them to ask questions as they arise throughout their visit. Diagnosis     Clinical Impression:   1. Dizziness        Attestation:  I personally performed the services described in this documentation on this date 10/25/2019 for patient Minda Caruso. Arlette Duncan MD    Please note that this dictation was completed with [employer], the computer voice recognition software. Quite often unanticipated grammatical, syntax, homophones, and other interpretive errors are inadvertently transcribed by the computer software.  Please disregard these errors. Please excuse any errors that have escaped final proofreading.